# Patient Record
Sex: MALE | Race: WHITE | URBAN - METROPOLITAN AREA
[De-identification: names, ages, dates, MRNs, and addresses within clinical notes are randomized per-mention and may not be internally consistent; named-entity substitution may affect disease eponyms.]

---

## 2018-03-27 ENCOUNTER — HOSPITAL ENCOUNTER (OUTPATIENT)
Dept: ENDOSCOPY | Age: 58
Discharge: HOME | End: 2018-03-27
Attending: INTERNAL MEDICINE | Admitting: INTERNAL MEDICINE

## 2018-03-27 NOTE — ANESTHESIA PRE-OP
Department of Anesthesiology  Preprocedure Note       Name:  Raiba Pac   Age:  61 y.o.  :  1960                                          MRN:  [id number]         Date:  3/27/2018      Surgeon:    Procedure:    Medications prior to admission:   Prior to Admission medications    Medication Sig Start Date End Date Taking? Authorizing Provider   tamsulosin (FLOMAX) 0.4 MG capsule Take 0.4 mg by mouth daily    Historical Provider, MD   omeprazole (PRILOSEC) 10 MG delayed release capsule Take 10 mg by mouth daily    Historical Provider, MD       Current medications:    Current Outpatient Prescriptions   Medication Sig Dispense Refill    tamsulosin (FLOMAX) 0.4 MG capsule Take 0.4 mg by mouth daily      omeprazole (PRILOSEC) 10 MG delayed release capsule Take 10 mg by mouth daily       No current facility-administered medications for this encounter. Allergies:  No Known Allergies    Problem List:  There is no problem list on file for this patient. Past Medical History:        Diagnosis Date    Enlarged prostate     Kidney stones     x 3 episodes       Past Surgical History:        Procedure Laterality Date    LITHOTRIPSY      TONSILLECTOMY         Social History:    Social History   Substance Use Topics    Smoking status: Not on file    Smokeless tobacco: Not on file    Alcohol use Not on file                                Counseling given: Not Answered      Vital Signs (Current): There were no vitals filed for this visit. BP Readings from Last 3 Encounters:   No data found for BP       NPO Status:                                                                                 BMI:   Wt Readings from Last 3 Encounters:   18 195 lb (88.5 kg)     There is no height or weight on file to calculate BMI.     Anesthesia Evaluation  Patient summary reviewed and Nursing notes reviewed  Airway: Mallampati: II  TM distance: >3 FB   Neck ROM: full  Mouth opening: > = 3 FB Dental:          Pulmonary:                              Cardiovascular:  Exercise tolerance: good (>4 METS),                     Neuro/Psych:               GI/Hepatic/Renal:             Endo/Other:                     Abdominal:           Vascular:                                        Anesthesia Plan      TIVA     ASA 1       Induction: intravenous. MIPS: Postoperative opioids intended and Prophylactic antiemetics administered. Anesthetic plan and risks discussed with patient. Plan discussed with CRNA.     Attending anesthesiologist reviewed and agrees with Bibi Livingston MD   3/27/2018

## 2021-07-29 ENCOUNTER — TELEPHONE (OUTPATIENT)
Dept: FAMILY MEDICINE CLINIC | Age: 61
End: 2021-07-29

## 2021-07-29 NOTE — LETTER
[address]  [place] [address]  Phone: [phone]  Fax: [phone]            July 30, 2021     [place] [address]      Dear Kimberly Lopez: We are sorry that you missed your appointment with Dr. Ayana Fox on 07/28/21. Your health and follow-up medical care are important to us. Please call our office as soon as possible so that we may reschedule your appointment. If you have already rescheduled your appointment, please disregard this letter. Sincerely,        Dr. Gerhardt Hams. Brown/DANIKA

## 2021-07-29 NOTE — TELEPHONE ENCOUNTER
Writer placed telephone call to patient in attempts to reschedule missed appointment on 07/28/21 with Dr. Meek Monae. Voicemail message left to call office and reschedule. Message # 2.

## 2021-07-30 NOTE — TELEPHONE ENCOUNTER
Writer placed telephone call to patient in attempts to reschedule missed appointment on 07/28/21 with Dr. Jez Hernandez. Voicemail message left to call office and  . Message # 3.  Letter sent

## 2023-06-02 ENCOUNTER — OFFICE VISIT (OUTPATIENT)
Dept: ENT CLINIC | Age: 63
End: 2023-06-02
Payer: COMMERCIAL

## 2023-06-02 VITALS
BODY MASS INDEX: 24.38 KG/M2 | WEIGHT: 180 LBS | SYSTOLIC BLOOD PRESSURE: 112 MMHG | TEMPERATURE: 98.3 F | HEIGHT: 72 IN | OXYGEN SATURATION: 97 % | HEART RATE: 61 BPM | DIASTOLIC BLOOD PRESSURE: 75 MMHG

## 2023-06-02 DIAGNOSIS — H91.93 BILATERAL HEARING LOSS, UNSPECIFIED HEARING LOSS TYPE: Primary | ICD-10-CM

## 2023-06-02 DIAGNOSIS — J34.2 DNS (DEVIATED NASAL SEPTUM): ICD-10-CM

## 2023-06-02 DIAGNOSIS — H93.13 TINNITUS OF BOTH EARS: ICD-10-CM

## 2023-06-02 PROCEDURE — 99203 OFFICE O/P NEW LOW 30 MIN: CPT | Performed by: STUDENT IN AN ORGANIZED HEALTH CARE EDUCATION/TRAINING PROGRAM

## 2023-06-02 NOTE — PROGRESS NOTES
[address] [facility] SURGERY  NEW PATIENT HISTORY AND PHYSICAL NOTE      Patient Name: Sahil Estrada  Medical Record Number:  [id number]  Primary Care Physician:  No primary care provider on file. ChiefComplaint     Chief Complaint   Patient presents with    Hearing Problem     Hearing test at [facility], tinnitus,        History of Present Illness     Sahil Estrada is an 60 y.o. male presenting with suspected chronic gradual hearing loss for years, not hearing people as well as he would like. Recently had hearing evaluation [facility] and was noted to have multiple consistent responses at certain frequencies. Denies recent changes in hearing. He has a history of longstanding bilateral tinnitus, recently unchanged and minimally bothersome. 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 No otalgia. No otorrhea. No history of chronic ear infections. No history of otologic surgery. No family history of early onset hearing loss. Denies vertigo. No environmental or seasonal allergies. Past Medical History     Past Medical History:   Diagnosis Date    Enlarged prostate     Kidney stones     x 3 episodes       Past Surgical History     Past Surgical History:   Procedure Laterality Date    LITHOTRIPSY      TONSILLECTOMY         Family History     No family history on file. Social History     Social History     Tobacco Use    Smoking status: Never    Smokeless tobacco: Never        Allergies     No Known Allergies    Medications     Current Outpatient Medications   Medication Sig Dispense Refill    omeprazole (PRILOSEC) 10 MG delayed release capsule Take 1 capsule by mouth daily      tamsulosin (FLOMAX) 0.4 MG capsule Take 0.4 mg by mouth daily (Patient not taking: Reported on 6/2/2023)       No current facility-administered medications for this visit.        Review of Systems     REVIEW OF SYSTEMS    See HPI

## 2023-06-27 ENCOUNTER — OFFICE VISIT (OUTPATIENT)
Dept: ENT CLINIC | Age: 63
End: 2023-06-27
Payer: COMMERCIAL

## 2023-06-27 ENCOUNTER — PROCEDURE VISIT (OUTPATIENT)
Dept: AUDIOLOGY | Age: 63
End: 2023-06-27
Payer: COMMERCIAL

## 2023-06-27 VITALS
SYSTOLIC BLOOD PRESSURE: 126 MMHG | BODY MASS INDEX: 25.33 KG/M2 | DIASTOLIC BLOOD PRESSURE: 82 MMHG | WEIGHT: 187 LBS | HEART RATE: 66 BPM | OXYGEN SATURATION: 94 % | HEIGHT: 72 IN | TEMPERATURE: 97.1 F

## 2023-06-27 DIAGNOSIS — H93.13 TINNITUS OF BOTH EARS: ICD-10-CM

## 2023-06-27 DIAGNOSIS — H90.3 SENSORINEURAL HEARING LOSS, BILATERAL: Primary | ICD-10-CM

## 2023-06-27 DIAGNOSIS — H90.3 SENSORINEURAL HEARING LOSS (SNHL) OF BOTH EARS: Primary | ICD-10-CM

## 2023-06-27 PROCEDURE — 92557 COMPREHENSIVE HEARING TEST: CPT

## 2023-06-27 PROCEDURE — 99213 OFFICE O/P EST LOW 20 MIN: CPT | Performed by: STUDENT IN AN ORGANIZED HEALTH CARE EDUCATION/TRAINING PROGRAM

## 2023-06-27 PROCEDURE — 92567 TYMPANOMETRY: CPT

## 2023-06-27 RX ORDER — DONEPEZIL HYDROCHLORIDE 10 MG/1
TABLET, FILM COATED ORAL
COMMUNITY
Start: 2023-05-31

## 2025-02-17 ENCOUNTER — OFFICE VISIT (OUTPATIENT)
Dept: SURGERY | Age: 65
End: 2025-02-17
Payer: MEDICARE

## 2025-02-17 VITALS
BODY MASS INDEX: 25.47 KG/M2 | SYSTOLIC BLOOD PRESSURE: 118 MMHG | DIASTOLIC BLOOD PRESSURE: 80 MMHG | HEIGHT: 72 IN | WEIGHT: 188 LBS

## 2025-02-17 DIAGNOSIS — K40.90 NON-RECURRENT UNILATERAL INGUINAL HERNIA WITHOUT OBSTRUCTION OR GANGRENE: Primary | ICD-10-CM

## 2025-02-17 PROCEDURE — 99203 OFFICE O/P NEW LOW 30 MIN: CPT | Performed by: SURGERY

## 2025-02-17 PROCEDURE — 1036F TOBACCO NON-USER: CPT | Performed by: SURGERY

## 2025-02-17 PROCEDURE — G8427 DOCREV CUR MEDS BY ELIG CLIN: HCPCS | Performed by: SURGERY

## 2025-02-17 PROCEDURE — 3017F COLORECTAL CA SCREEN DOC REV: CPT | Performed by: SURGERY

## 2025-02-17 PROCEDURE — 1123F ACP DISCUSS/DSCN MKR DOCD: CPT | Performed by: SURGERY

## 2025-02-17 PROCEDURE — G8419 CALC BMI OUT NRM PARAM NOF/U: HCPCS | Performed by: SURGERY

## 2025-02-17 RX ORDER — UBIDECARENONE 75 MG
50 CAPSULE ORAL DAILY
COMMUNITY

## 2025-02-17 ASSESSMENT — ENCOUNTER SYMPTOMS
EYE ITCHING: 0
EYE DISCHARGE: 0
APNEA: 0
BACK PAIN: 0
ABDOMINAL PAIN: 1
CHEST TIGHTNESS: 0
COLOR CHANGE: 0
ABDOMINAL DISTENTION: 0

## 2025-02-17 NOTE — PROGRESS NOTES
:     Varghese Spencer is a 64 y.o. male     CC: Bulge in the right groin region    HPI: 64-year-old male who presents for evaluation of a bulge in the right groin region which was noted within the last few months.  He is status post robotic laparoscopic prostatectomy in December of last year.  Bulge in the right groin region is moderately symptomatic from a pain standpoint.  The pain is described as achy.  It is worse with activity or sneezing.  Nothing makes it better.  No history of nausea, vomiting, anorexia, unexpected weight loss, fevers, chills, change bilobular urinary symptoms.      Past Medical History:   Diagnosis Date    Enlarged prostate     Kidney stones     x 3 episodes       Patient has no known allergies.     Past Surgical History:   Procedure Laterality Date    LITHOTRIPSY      TONSILLECTOMY          Prior to Visit Medications    Medication Sig Taking? Authorizing Provider   vitamin B-12 (CYANOCOBALAMIN) 100 MCG tablet Take 0.5 tablets by mouth daily Yes ProviderBonilla MD   donepezil (ARICEPT) 10 MG tablet  Yes ProviderBonilla MD   tamsulosin (FLOMAX) 0.4 MG capsule Take 1 capsule by mouth daily Yes ProviderBonilla MD   omeprazole (PRILOSEC) 10 MG delayed release capsule Take 1 capsule by mouth daily Yes Provider, MD Bonilla       Social History     Socioeconomic History    Marital status:      Spouse name: Not on file    Number of children: Not on file    Years of education: Not on file    Highest education level: Not on file   Occupational History    Not on file   Tobacco Use    Smoking status: Never    Smokeless tobacco: Never   Substance and Sexual Activity    Alcohol use: Not on file    Drug use: Not on file    Sexual activity: Not on file   Other Topics Concern    Not on file   Social History Narrative    Not on file     Social Determinants of Health     Financial Resource Strain: Not on file   Food Insecurity: Not on file   Transportation Needs: Not on file

## 2025-02-18 ENCOUNTER — PREP FOR PROCEDURE (OUTPATIENT)
Dept: SURGERY | Age: 65
End: 2025-02-18

## 2025-02-18 PROBLEM — K42.9 HERNIA, UMBILICAL: Status: ACTIVE | Noted: 2025-02-18

## 2025-02-18 PROBLEM — K40.90 INGUINAL HERNIA: Status: ACTIVE | Noted: 2025-02-18

## 2025-02-18 NOTE — PROGRESS NOTES
Per pt's wife, pt still legally can make his own health care decisions.  Pt's wife will be present dos.

## 2025-02-19 ENCOUNTER — ANESTHESIA (OUTPATIENT)
Dept: OPERATING ROOM | Age: 65
End: 2025-02-19
Payer: MEDICARE

## 2025-02-19 ENCOUNTER — ANESTHESIA EVENT (OUTPATIENT)
Dept: OPERATING ROOM | Age: 65
End: 2025-02-19
Payer: MEDICARE

## 2025-02-19 ENCOUNTER — HOSPITAL ENCOUNTER (OUTPATIENT)
Age: 65
Setting detail: OUTPATIENT SURGERY
Discharge: HOME | End: 2025-02-19
Attending: SURGERY | Admitting: SURGERY
Payer: MEDICARE

## 2025-02-19 VITALS
HEART RATE: 72 BPM | OXYGEN SATURATION: 96 % | SYSTOLIC BLOOD PRESSURE: 104 MMHG | TEMPERATURE: 97.5 F | DIASTOLIC BLOOD PRESSURE: 92 MMHG | HEIGHT: 72 IN | RESPIRATION RATE: 13 BRPM | WEIGHT: 184.5 LBS | BODY MASS INDEX: 24.99 KG/M2

## 2025-02-19 DIAGNOSIS — K42.9 HERNIA, UMBILICAL: ICD-10-CM

## 2025-02-19 DIAGNOSIS — K40.90 INGUINAL HERNIA: ICD-10-CM

## 2025-02-19 PROCEDURE — 2500000003 HC RX 250 WO HCPCS: Performed by: SURGERY

## 2025-02-19 PROCEDURE — 2580000003 HC RX 258: Performed by: NURSE ANESTHETIST, CERTIFIED REGISTERED

## 2025-02-19 PROCEDURE — 7100000010 HC PHASE II RECOVERY - FIRST 15 MIN: Performed by: SURGERY

## 2025-02-19 PROCEDURE — 3600000002 HC SURGERY LEVEL 2 BASE: Performed by: SURGERY

## 2025-02-19 PROCEDURE — 6360000002 HC RX W HCPCS: Performed by: SURGERY

## 2025-02-19 PROCEDURE — 6360000002 HC RX W HCPCS: Performed by: NURSE ANESTHETIST, CERTIFIED REGISTERED

## 2025-02-19 PROCEDURE — 2709999900 HC NON-CHARGEABLE SUPPLY: Performed by: SURGERY

## 2025-02-19 PROCEDURE — C1781 MESH (IMPLANTABLE): HCPCS | Performed by: SURGERY

## 2025-02-19 PROCEDURE — 6370000000 HC RX 637 (ALT 250 FOR IP): Performed by: STUDENT IN AN ORGANIZED HEALTH CARE EDUCATION/TRAINING PROGRAM

## 2025-02-19 PROCEDURE — 3700000001 HC ADD 15 MINUTES (ANESTHESIA): Performed by: SURGERY

## 2025-02-19 PROCEDURE — 7100000011 HC PHASE II RECOVERY - ADDTL 15 MIN: Performed by: SURGERY

## 2025-02-19 PROCEDURE — 3600000012 HC SURGERY LEVEL 2 ADDTL 15MIN: Performed by: SURGERY

## 2025-02-19 PROCEDURE — 2500000003 HC RX 250 WO HCPCS: Performed by: NURSE ANESTHETIST, CERTIFIED REGISTERED

## 2025-02-19 PROCEDURE — 7100000001 HC PACU RECOVERY - ADDTL 15 MIN: Performed by: SURGERY

## 2025-02-19 PROCEDURE — 88302 TISSUE EXAM BY PATHOLOGIST: CPT

## 2025-02-19 PROCEDURE — 3700000000 HC ANESTHESIA ATTENDED CARE: Performed by: SURGERY

## 2025-02-19 PROCEDURE — 49591 RPR AA HRN 1ST < 3 CM RDC: CPT | Performed by: SURGERY

## 2025-02-19 PROCEDURE — 49505 PRP I/HERN INIT REDUC >5 YR: CPT | Performed by: SURGERY

## 2025-02-19 PROCEDURE — 7100000000 HC PACU RECOVERY - FIRST 15 MIN: Performed by: SURGERY

## 2025-02-19 DEVICE — MESH HERN W3XL6IN POLYPR MFIL RECTANG: Type: IMPLANTABLE DEVICE | Site: INGUINAL | Status: FUNCTIONAL

## 2025-02-19 RX ORDER — NALOXONE HYDROCHLORIDE 0.4 MG/ML
INJECTION, SOLUTION INTRAMUSCULAR; INTRAVENOUS; SUBCUTANEOUS PRN
Status: DISCONTINUED | OUTPATIENT
Start: 2025-02-19 | End: 2025-02-19 | Stop reason: HOSPADM

## 2025-02-19 RX ORDER — OXYCODONE HYDROCHLORIDE 5 MG/1
10 TABLET ORAL PRN
Status: COMPLETED | OUTPATIENT
Start: 2025-02-19 | End: 2025-02-19

## 2025-02-19 RX ORDER — SODIUM CHLORIDE 0.9 % (FLUSH) 0.9 %
5-40 SYRINGE (ML) INJECTION PRN
Status: DISCONTINUED | OUTPATIENT
Start: 2025-02-19 | End: 2025-02-19 | Stop reason: HOSPADM

## 2025-02-19 RX ORDER — ONDANSETRON 2 MG/ML
4 INJECTION INTRAMUSCULAR; INTRAVENOUS
Status: DISCONTINUED | OUTPATIENT
Start: 2025-02-19 | End: 2025-02-19 | Stop reason: HOSPADM

## 2025-02-19 RX ORDER — KETOROLAC TROMETHAMINE 30 MG/ML
INJECTION, SOLUTION INTRAMUSCULAR; INTRAVENOUS
Status: DISCONTINUED | OUTPATIENT
Start: 2025-02-19 | End: 2025-02-19 | Stop reason: SDUPTHER

## 2025-02-19 RX ORDER — HYDROMORPHONE HYDROCHLORIDE 2 MG/ML
0.5 INJECTION, SOLUTION INTRAMUSCULAR; INTRAVENOUS; SUBCUTANEOUS EVERY 5 MIN PRN
Status: DISCONTINUED | OUTPATIENT
Start: 2025-02-19 | End: 2025-02-19 | Stop reason: HOSPADM

## 2025-02-19 RX ORDER — FENTANYL CITRATE 50 UG/ML
25 INJECTION, SOLUTION INTRAMUSCULAR; INTRAVENOUS EVERY 5 MIN PRN
Status: DISCONTINUED | OUTPATIENT
Start: 2025-02-19 | End: 2025-02-19 | Stop reason: HOSPADM

## 2025-02-19 RX ORDER — PHENYLEPHRINE HCL IN 0.9% NACL 1 MG/10 ML
SYRINGE (ML) INTRAVENOUS
Status: DISCONTINUED | OUTPATIENT
Start: 2025-02-19 | End: 2025-02-19 | Stop reason: SDUPTHER

## 2025-02-19 RX ORDER — GLYCOPYRROLATE 0.2 MG/ML
INJECTION INTRAMUSCULAR; INTRAVENOUS
Status: DISCONTINUED | OUTPATIENT
Start: 2025-02-19 | End: 2025-02-19 | Stop reason: SDUPTHER

## 2025-02-19 RX ORDER — BUPIVACAINE HYDROCHLORIDE AND EPINEPHRINE 5; 5 MG/ML; UG/ML
INJECTION, SOLUTION EPIDURAL; INTRACAUDAL; PERINEURAL
Status: COMPLETED | OUTPATIENT
Start: 2025-02-19 | End: 2025-02-19

## 2025-02-19 RX ORDER — HYDROCODONE BITARTRATE AND ACETAMINOPHEN 5; 325 MG/1; MG/1
1-2 TABLET ORAL EVERY 4 HOURS PRN
Qty: 15 TABLET | Refills: 0 | Status: SHIPPED | OUTPATIENT
Start: 2025-02-19 | End: 2025-02-22

## 2025-02-19 RX ORDER — SODIUM CHLORIDE 9 MG/ML
INJECTION, SOLUTION INTRAVENOUS PRN
Status: DISCONTINUED | OUTPATIENT
Start: 2025-02-19 | End: 2025-02-19 | Stop reason: HOSPADM

## 2025-02-19 RX ORDER — SUCCINYLCHOLINE/SOD CL,ISO/PF 200MG/10ML
SYRINGE (ML) INTRAVENOUS
Status: DISCONTINUED | OUTPATIENT
Start: 2025-02-19 | End: 2025-02-19 | Stop reason: SDUPTHER

## 2025-02-19 RX ORDER — SODIUM CHLORIDE 9 MG/ML
INJECTION, SOLUTION INTRAVENOUS
Status: DISCONTINUED | OUTPATIENT
Start: 2025-02-19 | End: 2025-02-19 | Stop reason: SDUPTHER

## 2025-02-19 RX ORDER — SODIUM CHLORIDE 0.9 % (FLUSH) 0.9 %
5-40 SYRINGE (ML) INJECTION EVERY 12 HOURS SCHEDULED
Status: DISCONTINUED | OUTPATIENT
Start: 2025-02-19 | End: 2025-02-19 | Stop reason: HOSPADM

## 2025-02-19 RX ORDER — MAGNESIUM HYDROXIDE 1200 MG/15ML
LIQUID ORAL CONTINUOUS PRN
Status: COMPLETED | OUTPATIENT
Start: 2025-02-19 | End: 2025-02-19

## 2025-02-19 RX ORDER — OXYCODONE HYDROCHLORIDE 5 MG/1
5 TABLET ORAL PRN
Status: COMPLETED | OUTPATIENT
Start: 2025-02-19 | End: 2025-02-19

## 2025-02-19 RX ORDER — DEXAMETHASONE SODIUM PHOSPHATE 4 MG/ML
INJECTION, SOLUTION INTRA-ARTICULAR; INTRALESIONAL; INTRAMUSCULAR; INTRAVENOUS; SOFT TISSUE
Status: DISCONTINUED | OUTPATIENT
Start: 2025-02-19 | End: 2025-02-19 | Stop reason: SDUPTHER

## 2025-02-19 RX ORDER — FENTANYL CITRATE 50 UG/ML
INJECTION, SOLUTION INTRAMUSCULAR; INTRAVENOUS
Status: DISCONTINUED | OUTPATIENT
Start: 2025-02-19 | End: 2025-02-19 | Stop reason: SDUPTHER

## 2025-02-19 RX ORDER — ONDANSETRON 2 MG/ML
INJECTION INTRAMUSCULAR; INTRAVENOUS
Status: DISCONTINUED | OUTPATIENT
Start: 2025-02-19 | End: 2025-02-19 | Stop reason: SDUPTHER

## 2025-02-19 RX ORDER — PROPOFOL 10 MG/ML
INJECTION, EMULSION INTRAVENOUS
Status: DISCONTINUED | OUTPATIENT
Start: 2025-02-19 | End: 2025-02-19 | Stop reason: SDUPTHER

## 2025-02-19 RX ORDER — METOCLOPRAMIDE HYDROCHLORIDE 5 MG/ML
10 INJECTION INTRAMUSCULAR; INTRAVENOUS
Status: DISCONTINUED | OUTPATIENT
Start: 2025-02-19 | End: 2025-02-19 | Stop reason: HOSPADM

## 2025-02-19 RX ORDER — LIDOCAINE HYDROCHLORIDE 20 MG/ML
INJECTION, SOLUTION EPIDURAL; INFILTRATION; INTRACAUDAL; PERINEURAL
Status: DISCONTINUED | OUTPATIENT
Start: 2025-02-19 | End: 2025-02-19 | Stop reason: SDUPTHER

## 2025-02-19 RX ADMIN — Medication 100 MCG: at 14:15

## 2025-02-19 RX ADMIN — ONDANSETRON 4 MG: 2 INJECTION INTRAMUSCULAR; INTRAVENOUS at 14:00

## 2025-02-19 RX ADMIN — GLYCOPYRROLATE 0.2 MG: 0.2 INJECTION INTRAMUSCULAR; INTRAVENOUS at 13:40

## 2025-02-19 RX ADMIN — Medication 140 MG: at 13:31

## 2025-02-19 RX ADMIN — FENTANYL CITRATE 50 MCG: 50 INJECTION, SOLUTION INTRAMUSCULAR; INTRAVENOUS at 13:24

## 2025-02-19 RX ADMIN — PROPOFOL 50 MG: 10 INJECTION, EMULSION INTRAVENOUS at 13:26

## 2025-02-19 RX ADMIN — Medication 100 MCG: at 13:38

## 2025-02-19 RX ADMIN — PROPOFOL 30 MG: 10 INJECTION, EMULSION INTRAVENOUS at 13:52

## 2025-02-19 RX ADMIN — GLYCOPYRROLATE 0.2 MG: 0.2 INJECTION INTRAMUSCULAR; INTRAVENOUS at 13:43

## 2025-02-19 RX ADMIN — CEFAZOLIN 2000 MG: 2 INJECTION, POWDER, FOR SOLUTION INTRAMUSCULAR; INTRAVENOUS at 13:25

## 2025-02-19 RX ADMIN — PROPOFOL 150 MG: 10 INJECTION, EMULSION INTRAVENOUS at 13:24

## 2025-02-19 RX ADMIN — LIDOCAINE HYDROCHLORIDE 100 MG: 20 INJECTION, SOLUTION EPIDURAL; INFILTRATION; INTRACAUDAL; PERINEURAL at 13:24

## 2025-02-19 RX ADMIN — FENTANYL CITRATE 25 MCG: 50 INJECTION, SOLUTION INTRAMUSCULAR; INTRAVENOUS at 14:00

## 2025-02-19 RX ADMIN — FENTANYL CITRATE 50 MCG: 50 INJECTION, SOLUTION INTRAMUSCULAR; INTRAVENOUS at 13:29

## 2025-02-19 RX ADMIN — OXYCODONE 10 MG: 5 TABLET ORAL at 15:42

## 2025-02-19 RX ADMIN — DEXAMETHASONE SODIUM PHOSPHATE 4 MG: 4 INJECTION, SOLUTION INTRAMUSCULAR; INTRAVENOUS at 13:42

## 2025-02-19 RX ADMIN — Medication 100 MCG: at 14:07

## 2025-02-19 RX ADMIN — PROPOFOL 30 MG: 10 INJECTION, EMULSION INTRAVENOUS at 13:57

## 2025-02-19 RX ADMIN — SODIUM CHLORIDE: 9 INJECTION, SOLUTION INTRAVENOUS at 13:17

## 2025-02-19 RX ADMIN — KETOROLAC TROMETHAMINE 30 MG: 60 INJECTION, SOLUTION INTRAMUSCULAR at 14:00

## 2025-02-19 ASSESSMENT — PAIN SCALES - GENERAL: PAINLEVEL_OUTOF10: 5

## 2025-02-19 ASSESSMENT — PAIN - FUNCTIONAL ASSESSMENT: PAIN_FUNCTIONAL_ASSESSMENT: 0-10

## 2025-02-19 NOTE — ANESTHESIA PRE PROCEDURE
Department of Anesthesiology  Preprocedure Note       Name:  Varghese Spencer   Age:  64 y.o.  :  1960                                          MRN:  [id number]         Date:  2025      Surgeon: Surgeon(s):  Slim Ellison MD    Procedure: Procedure(s):  OPEN RIGHT INGUINAL HERNIA REPAIR WITH MESH  OPEN UMBILICAL HERNIA REPAIR    Medications prior to admission:   Prior to Admission medications    Medication Sig Start Date End Date Taking? Authorizing Provider   vitamin B-12 (CYANOCOBALAMIN) 100 MCG tablet Take 0.5 tablets by mouth daily   Yes Bonilla Lambert MD   donepezil (ARICEPT) 10 MG tablet  23  Yes ProviderBonilla MD       Current medications:    No current facility-administered medications for this encounter.       Allergies:  No Known Allergies    Problem List:    Patient Active Problem List   Diagnosis Code   • Inguinal hernia K40.90   • Hernia, umbilical K42.9       Past Medical History:        Diagnosis Date   • Alzheimer disease (HCC)    • Enlarged prostate    • Kidney stones     x 3 episodes       Past Surgical History:        Procedure Laterality Date   • LITHOTRIPSY     • PROSTATECTOMY     • SHOULDER SURGERY     • TONSILLECTOMY         Social History:    Social History     Tobacco Use   • Smoking status: Never   • Smokeless tobacco: Never   Substance Use Topics   • Alcohol use: Yes     Comment: rarely                                Counseling given: Not Answered      Vital Signs (Current):   Vitals:    25 0858 25 1113   BP:  (!) 148/77   Pulse:  55   Resp:  16   Temp:  97.6 °F (36.4 °C)   TempSrc:  Oral   SpO2:  100%   Weight: 85.3 kg (188 lb) 83.7 kg (184 lb 8 oz)   Height: 1.829 m (6')                                               BP Readings from Last 3 Encounters:   25 (!) 148/77   25 118/80   23 126/82       NPO Status: Time of last liquid consumption:                         Time of last solid consumption:

## 2025-02-19 NOTE — BRIEF OP NOTE
Brief Postoperative Note      Patient: Varghese Spencer  YOB: 1960  MRN: [id number]    Date of Procedure: 2/19/2025    Pre-Op Diagnosis Codes:      * Inguinal hernia [K40.90]     * Hernia, umbilical [K42.9]    Post-Op Diagnosis: Same       Procedure(s):  OPEN RIGHT INGUINAL HERNIA REPAIR WITH MESH  OPEN UMBILICAL HERNIA REPAIR    Surgeon(s):  Slim Ellison MD    Assistant:  Surgical Assistant: Yaa Leahy    Anesthesia: General    Estimated Blood Loss (mL): Minimal    Complications: None    Specimens:   ID Type Source Tests Collected by Time Destination   A : A) HERNIA SAC Tissue Tissue SURGICAL PATHOLOGY Slim Ellison MD 2/19/2025 1412        Implants:  Implant Name Type Inv. Item Serial No.  Lot No. LRB No. Used Action   MESH KEHINDE [id number] POLYPR MFIL RECTANG - [id number]  MESH KEHINDE [id number] POLYPR MFIL RECTANG  [place] DAVOL- [id number]  1 Implanted         Drains: * No LDAs found *    Findings:  Infection Present At Time Of Surgery (PATOS) (choose all levels that have infection present):  No infection present  Other Findings: Umbilical hernia repaired, right inguinal hernia repaired with mesh    Electronically signed by Slim Ellison MD on 2/19/2025 at 2:29 PM

## 2025-02-19 NOTE — H&P
[facility]  History and Physical Update          I have reviewed the history and physical and examined the patient and find no relevant changes.    I have reviewed with the patient and/or family the risks, benefits, and alternatives to the procedure.    BP (!) 148/77   Pulse 55   Temp 97.6 °F (36.4 °C) (Oral)   Resp 16   Ht 1.829 m (6')   Wt 83.7 kg (184 lb 8 oz)   SpO2 100%   BMI 25.02 kg/m²     Slim Ellison MD  2/19/2025

## 2025-02-19 NOTE — DISCHARGE INSTRUCTIONS
Postoperative Instructions      Contact information     Office number - [phone]  Office hours are 8 am - 5 pm  Monday - Friday  Contact the doctor on call during the evenings ( 5 pm - 8 am ) or on Weekends for urgent or emergent issues by using the main office number ( [phone] ).  Please hold routine questions until normal business hours.        Wound care    The incisions are closed with dissolvable sutures which are beneath the skin. Surgical glue is then applied to the skin. The glue is purple in color and should be left undisturbed until your follow-up appointment.  No bandages are required over the surgical glue.  It is okay to shower but do not bathe in a bathtub.  Gently wash over the incisions with soap and water and then pat them dry with a towel. Contact the office for redness of the skin surrounding the incision or if there is drainage of pus.        Activities    It is generally okay to go up and down stairs. Please be careful as your gate may be unsteady from the surgery or pain medications.    Driving: Do not drive while on narcotic pain medications or while still under the effect of narcotic pain medications. Make sure that you are moving comfortably and not limited by postoperative pain or weakness that would make it difficult to react in an emergency situation.    Exercise : The main purpose of the activity restrictions is to reduce the risk of developing a hernia at an incision site.                   Do not lift greater than 25 lbs                   Avoid strenuous abdominal exercises- sit ups, core workouts                   Light cardiovascular exercise is generally okay                   Ask your doctor about the length of the exercise restrictions    Follow up    Please call the office for any concerns between the time of surgery and your follow up appointment.  For your convenience, we ask that you schedule your follow up appointment about 2 weeks from the time of surgery at a time

## 2025-02-19 NOTE — ANESTHESIA POSTPROCEDURE EVALUATION
Department of Anesthesiology  Postprocedure Note    Patient: Varghese Spencer  MRN: [id number]  YOB: 1960  Date of evaluation: 2/19/2025    Procedure Summary       Date: 02/19/25 Room / Location: [address]    Anesthesia Start: 1317 Anesthesia Stop: 1448    Procedures:       OPEN RIGHT INGUINAL HERNIA REPAIR WITH MESH (Right: Groin)      OPEN UMBILICAL HERNIA REPAIR (Abdomen) Diagnosis:       Inguinal hernia      Hernia, umbilical      (Inguinal hernia [K40.90])      (Hernia, umbilical [K42.9])    Surgeons: Slim Ellison MD Responsible Provider: Daniel Duke DO    Anesthesia Type: general ASA Status: 2            Anesthesia Type: No value filed.    Cecile Phase I: Cecile Score: 9    Cecile Phase II:      Anesthesia Post Evaluation    Patient location during evaluation: PACU  Patient participation: complete - patient participated  Level of consciousness: awake and alert  Airway patency: patent  Nausea & Vomiting: no nausea  Cardiovascular status: hemodynamically stable  Respiratory status: acceptable  Hydration status: stable  Pain management: adequate    No notable events documented.

## 2025-02-19 NOTE — PROGRESS NOTES
Patient is alert, appropriate, oriented, VSS. Tolerating ice chips. Family to bedside, phase 1 recovery completed.

## 2025-02-20 NOTE — OP NOTE
[place] [address]                            OPERATIVE REPORT      PATIENT NAME: REAGAN DORANTES                : 1960  MED REC NO: [id number]                      ROOM: OR  ACCOUNT NO: [id number]                       ADMIT DATE: 2025  PROVIDER: Slim Ellison MD      DATE OF PROCEDURE:  2025    SURGEON:  Slim Ellison MD    PREOPERATIVE DIAGNOSES:  Umbilical hernia, right inguinal hernia.    POSTOPERATIVE DIAGNOSES:  Umbilical hernia, right inguinal hernia.    PROCEDURES:  Umbilical hernia repair, right inguinal hernia repair with mesh.    ANESTHESIA:  General endotracheal and local.    ESTIMATED BLOOD LOSS:  Minimal.    COMPLICATIONS:  None.    SPECIMENS:  Hernia sac and lipoma of the cord.    OPERATIVE INDICATIONS AND CONSENT:  The patient is a 64-year-old male with an umbilical hernia and a symptomatic right inguinal hernia.  The plan is for umbilical hernia repair and right inguinal hernia repair with mesh.  He was explained the risks, benefits, possible complications including risk of hernia recurrence, infection requiring mesh removal, or nerve entrapment.    DETAILS OF PROCEDURE:  The patient was brought to the operative suite, placed in supine position on the operative table.  After general endotracheal anesthesia, he was prepped and draped in usual sterile fashion.    We made a 2.5 cm curvilinear incision below the umbilicus.  The umbilicus was dissected free circumferentially and detached.  The hernia sac was dissected down to its base and entered flush with the fascia.  The umbilical hernia defect was in approximately the 1 cm range.  It was closed in a transverse orientation with interrupted 0 Ethibond sutures.  Once the repair was complete, the fascia was injected with 0.5% Marcaine.  The umbilicus was tacked back down to the fascia with 2-0 Vicryl suture.  The deep dermis was closed with

## 2025-03-06 ENCOUNTER — OFFICE VISIT (OUTPATIENT)
Dept: SURGERY | Age: 65
End: 2025-03-06

## 2025-03-06 VITALS — SYSTOLIC BLOOD PRESSURE: 122 MMHG | BODY MASS INDEX: 25.61 KG/M2 | WEIGHT: 188.8 LBS | DIASTOLIC BLOOD PRESSURE: 84 MMHG

## 2025-03-06 DIAGNOSIS — K40.90 NON-RECURRENT UNILATERAL INGUINAL HERNIA WITHOUT OBSTRUCTION OR GANGRENE: Primary | ICD-10-CM

## 2025-03-06 PROCEDURE — 99024 POSTOP FOLLOW-UP VISIT: CPT | Performed by: SURGERY

## 2025-03-06 NOTE — PROGRESS NOTES
Subjective   Patient ID: Varghese Spencer is a 64 y.o. male.    HPI    Review of Systems       Objective   Physical Exam   Abdomen soft  Incisions healing well  Likely small subcu fluid collection in right inguinal region.  The fluid collection is not symptomatic and should absorb with time  No evidence of infection    Assessment   64-year-old male status post open right inguinal hernia repair with mesh and umbilical hernia repair.  Doing well postoperatively.      Plan   Continue lifting restrictions for 2 more weeks  Follow-up as needed        Slim Ellison MD

## (undated) DEVICE — SYRINGE, LUER LOCK, 10ML: Brand: MEDLINE

## (undated) DEVICE — SOLUTION IRRIG 1000ML 09% SOD CHL USP PIC PLAS CONTAINER

## (undated) DEVICE — SUTURE VICRYL + SZ 2-0 L18IN ABSRB CLR TIE POLYGLACTIN 910 VCP111G

## (undated) DEVICE — APPLICATOR MEDICATED 26 CC SOLUTION HI LT ORNG CHLORAPREP

## (undated) DEVICE — SUTURE VICRYL + SZ 2-0 L27IN ABSRB WHT SH 1/2 CIR TAPERCUT VCP417H

## (undated) DEVICE — SUTURE PROL SZ 0 L18IN NONABSORBABLE BLU MO-6 L26MM 1/2 CIR C845G

## (undated) DEVICE — INTENDED FOR TISSUE SEPARATION, AND OTHER PROCEDURES THAT REQUIRE A SHARP SURGICAL BLADE TO PUNCTURE OR CUT.: Brand: BARD-PARKER ® STAINLESS STEEL BLADES

## (undated) DEVICE — PENCIL SMK EVAC TELSCP 3 M TBNG

## (undated) DEVICE — SUTURE ABSORBABLE L 18 IN SZ 4-0 NDL L 19 MM POLYGLACTIN 910 36/BX

## (undated) DEVICE — BLADE CLIPPER GEN PURP NS

## (undated) DEVICE — ATHLETIC SUPPORTER LATEX FREE, LARGE

## (undated) DEVICE — SHEET,DRAPE,40X58,STERILE: Brand: MEDLINE

## (undated) DEVICE — STERILE POLYISOPRENE POWDER-FREE SURGICAL GLOVES: Brand: PROTEXIS

## (undated) DEVICE — LIQUIBAND RAPID ADHESIVE 36/CS 0.8ML: Brand: MEDLINE

## (undated) DEVICE — SUTURE VICRYL + SZ 3-0 L27IN ABSRB UD L26MM SH 1/2 CIR VCP416H

## (undated) DEVICE — SHEET, T, LAPAROTOMY, STERILE: Brand: MEDLINE

## (undated) DEVICE — SUTURE MONOCRYL + SZ 4-0 L27IN ABSRB UD L19MM PS-2 3/8 CIR MCP426H

## (undated) DEVICE — BLANKET WRM W29.9XL79.1IN UP BODY FORC AIR MISTRAL-AIR

## (undated) DEVICE — GAUZE,SPONGE,4"X4",8PLY,STRL,LF,10/TRAY: Brand: MEDLINE

## (undated) DEVICE — SYRINGE MED 10ML TRNSLUC BRL PLUNG BLK MRK POLYPR CTRL

## (undated) DEVICE — PENROSE DRAIN 12" X 1/2": Brand: CARDINAL HEALTH

## (undated) DEVICE — SUTURE ETHIBOND EXCEL SZ 0 L18IN NONABSORBABLE GRN L26MM MO-6 CX45D

## (undated) DEVICE — COVER LT HNDL BLU PLAS

## (undated) DEVICE — MAJOR SET UP: Brand: MEDLINE INDUSTRIES, INC.

## (undated) DEVICE — NEEDLE,22GX1.5",REG,BEVEL: Brand: MEDLINE